# Patient Record
Sex: FEMALE | Race: WHITE
[De-identification: names, ages, dates, MRNs, and addresses within clinical notes are randomized per-mention and may not be internally consistent; named-entity substitution may affect disease eponyms.]

---

## 2021-12-30 ENCOUNTER — HOSPITAL ENCOUNTER (INPATIENT)
Dept: HOSPITAL 96 - M.ERS | Age: 51
LOS: 6 days | Discharge: HOME HEALTH | DRG: 417 | End: 2022-01-05
Attending: INTERNAL MEDICINE | Admitting: INTERNAL MEDICINE
Payer: MEDICARE

## 2021-12-30 VITALS — HEIGHT: 60 IN | BODY MASS INDEX: 23.75 KG/M2 | WEIGHT: 121 LBS

## 2021-12-30 VITALS — DIASTOLIC BLOOD PRESSURE: 70 MMHG | SYSTOLIC BLOOD PRESSURE: 123 MMHG

## 2021-12-30 VITALS — DIASTOLIC BLOOD PRESSURE: 57 MMHG | SYSTOLIC BLOOD PRESSURE: 120 MMHG

## 2021-12-30 VITALS — SYSTOLIC BLOOD PRESSURE: 118 MMHG | DIASTOLIC BLOOD PRESSURE: 68 MMHG

## 2021-12-30 DIAGNOSIS — J15.9: ICD-10-CM

## 2021-12-30 DIAGNOSIS — K80.00: Primary | ICD-10-CM

## 2021-12-30 DIAGNOSIS — R65.10: ICD-10-CM

## 2021-12-30 DIAGNOSIS — E87.0: ICD-10-CM

## 2021-12-30 DIAGNOSIS — E87.6: ICD-10-CM

## 2021-12-30 DIAGNOSIS — J12.9: ICD-10-CM

## 2021-12-30 DIAGNOSIS — K42.9: ICD-10-CM

## 2021-12-30 DIAGNOSIS — G80.9: ICD-10-CM

## 2021-12-30 DIAGNOSIS — E86.0: ICD-10-CM

## 2021-12-30 DIAGNOSIS — Z79.899: ICD-10-CM

## 2021-12-30 DIAGNOSIS — Z20.822: ICD-10-CM

## 2021-12-30 LAB
ABSOLUTE EOSINOPHILS: 0.1 THOU/UL (ref 0–0.7)
ABSOLUTE MONOCYTES: 1.1 THOU/UL (ref 0–1.2)
ALBUMIN SERPL-MCNC: 3.3 G/DL (ref 3.4–5)
ALP SERPL-CCNC: 69 U/L (ref 46–116)
ALT SERPL-CCNC: 25 U/L (ref 30–65)
ANION GAP SERPL CALC-SCNC: 12 MMOL/L (ref 7–16)
AST SERPL-CCNC: 24 U/L (ref 15–37)
BILIRUB SERPL-MCNC: 0.8 MG/DL
BUN SERPL-MCNC: 10 MG/DL (ref 7–18)
CALCIUM SERPL-MCNC: 8.9 MG/DL (ref 8.5–10.1)
CHLORIDE SERPL-SCNC: 99 MMOL/L (ref 98–107)
CO2 SERPL-SCNC: 26 MMOL/L (ref 21–32)
CREAT SERPL-MCNC: 0.5 MG/DL (ref 0.6–1.3)
EOSINOPHIL NFR BLD: 1 %
GLUCOSE SERPL-MCNC: 86 MG/DL (ref 70–99)
GRANULOCYTES NFR BLD MANUAL: 80 %
HCT VFR BLD CALC: 40.3 % (ref 37–47)
HGB BLD-MCNC: 13.1 GM/DL (ref 12–15)
LIPASE: 60 U/L (ref 73–393)
LYMPHOCYTES # BLD: 1.5 THOU/UL (ref 0.8–5.3)
LYMPHOCYTES NFR BLD AUTO: 11 %
MAGNESIUM SERPL-MCNC: 2 MG/DL (ref 1.8–2.4)
MCH RBC QN AUTO: 27.5 PG (ref 26–34)
MCHC RBC AUTO-ENTMCNC: 32.4 G/DL (ref 28–37)
MCV RBC: 84.7 FL (ref 80–100)
MONOCYTES NFR BLD: 8 %
MPV: 7.8 FL. (ref 7.2–11.1)
NEUTROPHILS # BLD: 10.6 THOU/UL (ref 1.6–8.1)
NUCLEATED RBCS: 0 /100WBC
PLATELET # BLD EST: ADEQUATE 10*3/UL
PLATELET COUNT*: 319 THOU/UL (ref 150–400)
POTASSIUM SERPL-SCNC: 3.9 MMOL/L (ref 3.5–5.1)
PROT SERPL-MCNC: 7.7 G/DL (ref 6.4–8.2)
RBC # BLD AUTO: 4.76 MIL/UL (ref 4.2–5)
RBC MORPH BLD: NORMAL
RDW-CV: 13.3 % (ref 10.5–14.5)
SODIUM SERPL-SCNC: 137 MMOL/L (ref 136–145)
WBC # BLD AUTO: 13.2 THOU/UL (ref 4–11)

## 2021-12-30 NOTE — EKG
[place] [address]
Phone:  [phone]                     ELECTROCARDIOGRAM REPORT      
_______________________________________________________________________________
 
Name:         MONSERRAT BAUTISTA                 Room:                     REG ER 
M.R.#:    [id number]     Account #:     [id number]  
Admission:    21    Attend Phys:                     
Discharge:                Date of Birth: [birth year]  
Date of Service: 21 1216  Report #:      [id number]
        [id number]
_______________________________________________________________________________
THIS REPORT FOR:  //name//                      
 
                         [facility] ED
                                       
Test Date:    2021               Test Time:    12:16:06
Pat Name:     MONSERRAT BAUTISTA              Department:   
Patient ID:   [id number]            Room:          
Gender:       F                        Technician:   
:          1970               Requested By: Monse Jacobo
Order Number: [id number] MD:   Abbe Eckert
                                 Measurements
Intervals                              Axis          
Rate:         99                       P:            52
LA:           129                      QRS:          9
QRSD:         84                       T:            -6
QT:           322                                    
QTc:          414                                    
                           Interpretive Statements
Sinus rhythm
Probable left atrial enlargement
Low voltage, extremity and precordial leads
RSR' in V1 or V2, right VCD
Consider anterior infarct of indeterminate age
No previous ECG available for comparison
Electronically Signed On 2021 15:51:29 CST by Abbe Eckert
[email or web address]
 
 
 
 
 
 
 
 
 
 
 
 
 
 
 
 
 
 
  <ELECTRONICALLY SIGNED>
                                           By: Abbe Eckert MD, [facility]     
  21     1551
D: 21 1216   _____________________________________
T: 21 1216   Abbe Eckert MD, [facility]       /EPI

## 2021-12-30 NOTE — EMS
[address]  [address]                    EMS Patient Care Report       
_______________________________________________________________________________
 
Name:       MONSERRAT BAUTISTA                  Room:           [address] [place]  
[facility]#:  [id number]      Account #:      [id number]  
Admission:  21     Attend Phys:    Jil Sow MD 
Discharge:               Date of Birth:  [birth year]  
         Report #: [id number]
                                                                     [id number]
_______________________________________________________________________________
THIS REPORT FOR:  //name//                      
 
Report Transmitted: 2021 22:06
EMS Care Summary
[facility] [place]
Incident [id number] @ 2021 10:53
 
Incident Location
[address] [place], [place] [id number]
 
Patient
MONSERRAT BAUTISTA
Female, 51 Years
 1970
 
Patient Address
[address] [place] [address]
 
Patient History
Unspecified asthma,Cerebral palsy, unspecified,Spina bifida, unspecified,
 
Patient Allergies
No known allergies,
 
Patient Medications
Albuterol, levocetirizine, Ondansetron, Epipen,
 
Chief Complaint
Nausea
 
Disposition
Transported No Lights/Tyler
 
Dispatch Reason
Sick Person
 
Transported To
[facility]
 
Narrative
 DISPATCHED TO [facility].  RESPONDS WITH NO DELAYING FACTORS. 
UPON ARRIVAL PT IS SITTING IN RECLINER UNDER TWO BLANKETS. PT IS ALERT AND 
ORIENTED AT BASELINE WITH PATENT AIRWAY. PT NURSING STAFF STATES PT BEING 
NON-VERBAL. PT KNOWS BASIC SIGN LANGUAGE. PT HAS MANUAL VITALS OBTAINED WITH 
 
 
 
[address]  [address]                    EMS Patient Care Report       
_______________________________________________________________________________
 
Name:       MONSERRAT BAUTISTA                  Room:           [address] [place]  
[facility]#:  [id number]      Account #:      [id number]  
Admission:  21     Attend Phys:    Jil Sow MD 
Discharge:               Date of Birth:  [birth year]  
         Report #: [id number]
                                                                     [id number]
_______________________________________________________________________________
LUNG SOUNDS PRESENTING CLEAR. PT NURSING STAFF STATES PT VOMITING FOR ONE WEEK 
AND PRESENTED THIS MORNING WITH FEVER. NURSING STAFF STATES ADMINISTERING 
TYLENOL AT 1050 TO PT. PT TAKES TWO STEPS TO STRETCHER AND IS ASSISTED INTO 
POSITION OF COMFORT. PT IS SECURED TO STRETCHER AND TRANSPORTED TO AMBULANCE 
WITH NO INCIDENT. ONCE IN AMBULANCE PT IS PLACED ON MONITOR. PT HAS GLUCOSE 
OBTAINED. PT REMAINS ALERT AND ORIENTED AT BASELINE DURING TRANSPORT. PT 
REMAINS ON MONITOR DURING TRANSPORT. COVID PRECAUTIONS ACTIVATED PRIOR TO 
ARRIVAL OF DESTINATION. AT DESTINATION PT IS REMOVED FROM MONITOR. PT IS 
TRANSPORTED INTO FACILITY WITH NO INCIDENT. PT IS TRANSFERRED TO ER4 BED VIA 
BLANKET WITH ASSISTANCE FROM NURSING STAFF. PT IS STABLE AT TIME OF PT CARE 
TRANSFER. PT CARE IS TRANSFERRED. FACILITY SIGNED ON PT BEHALF DUE TO PT NOT 
HAVING RIGHTS ON MEDICAL DECISIONS.  CLEAR. 
 
Initial Vitals
@11:08SpO2: 97,
@11:13SpO2: 99,
@11:28SpO2: 97,
@11:12
@11:17
@11:27
@11:13P: 76,R: 18,BP: 113/80,
@11:08P: 99,R: 18,BP: 130/82,
@11:13GCS: 15,
@11:08GCS: 15,
@11:00Glucose: 88,
 
Assessments
@11:00MENTAL:SKIN:HEENT:LUNG 
SOUNDS:ABDOMEN:PELVIS//GI:EXTREMITIES:PULSE:NEURO: 
 
Impression
Nausea
 
Procedures
@11:12 3-Lead ECG Response: UnchangedSucceeded
@11:17 3-Lead ECG Response: UnchangedSucceeded
@11:27 3-Lead ECG Response: UnchangedSucceeded
 
 
Timeline
00:00,Call Received
10:52,Dispatch Notified
10:52,Psap Call
10:53,Dispatched
10:54,En Route
10:59,On Scene
 
 
 
[place] [address]                    EMS Patient Care Report       
_______________________________________________________________________________
 
Name:       MONSERRAT BAUTISTA                  Room:           [address] [place]  
[facility]#:  [id number]      Account #:      [id number]  
Admission:  21     Attend Phys:    Jil Sow MD 
Discharge:               Date of Birth:  [birth year]  
         Report #: [id number]
                                                                     [id number]
_______________________________________________________________________________
11:00,At Patient
11:00,BP: / M,PULSE: ,RR:  R,SPO2:  Ox,ETCO2:  ,B,PAIN: ,GCS: ,
11:08,BP: / M,PULSE: ,RR:  R,SPO2: 97 Ox,ETCO2:  ,BG: ,PAIN: ,GCS: ,
11:08,BP: 130/82 M,PULSE: 99,RR: 18 R,SPO2:  Ox,ETCO2:  ,BG: ,PAIN: ,GCS: ,
11:08,BP: / M,PULSE: ,RR:  R,SPO2:  Ox,ETCO2:  ,BG: ,PAIN: ,GCS: 15,
11:12,3-Lead ECG,Response: UnchangedSucceeded,
11:12,BP: / M,PULSE: ,RR:  R,SPO2:  Ox,ETCO2:  ,BG: ,PAIN: ,GCS: ,
11:13,BP: / M,PULSE: ,RR:  R,SPO2: 99 Ox,ETCO2:  ,BG: ,PAIN: ,GCS: ,
11:13,BP: 113/80 M,PULSE: 76,RR: 18 R,SPO2:  Ox,ETCO2:  ,BG: ,PAIN: ,GCS: ,
11:13,BP: / M,PULSE: ,RR:  R,SPO2:  Ox,ETCO2:  ,BG: ,PAIN: ,GCS: 15,
11:15,Depart Scene
11:17,3-Lead ECG,Response: UnchangedSucceeded,
11:17,BP: / M,PULSE: ,RR:  R,SPO2:  Ox,ETCO2:  ,BG: ,PAIN: ,GCS: ,
11:27,3-Lead ECG,Response: UnchangedSucceeded,
11:27,BP: / M,PULSE: ,RR:  R,SPO2:  Ox,ETCO2:  ,BG: ,PAIN: ,GCS: ,
11:28,BP: / M,PULSE: ,RR:  R,SPO2: 97 Ox,ETCO2:  ,BG: ,PAIN: ,GCS: ,
11:38,At Destination
11:52,Call Closed
 
Disclaimer
v1.1     Copyright  [employer]
This EMS Care Summary contains data elements from the applicable legal record 
(which may be displayed differently). It is designed to provide pertinent 
information for the following purposes: continuity of care, clinical quality, 
and state data reporting. The complete legal record is available to ED staff 
and administrators of the receiving hospital in [employer]'s Patient Tracker. All data 
is provided "as is."

## 2021-12-31 VITALS — DIASTOLIC BLOOD PRESSURE: 63 MMHG | SYSTOLIC BLOOD PRESSURE: 109 MMHG

## 2021-12-31 VITALS — SYSTOLIC BLOOD PRESSURE: 110 MMHG | DIASTOLIC BLOOD PRESSURE: 57 MMHG

## 2021-12-31 VITALS — DIASTOLIC BLOOD PRESSURE: 58 MMHG | SYSTOLIC BLOOD PRESSURE: 119 MMHG

## 2021-12-31 VITALS — SYSTOLIC BLOOD PRESSURE: 116 MMHG | DIASTOLIC BLOOD PRESSURE: 59 MMHG

## 2021-12-31 VITALS — SYSTOLIC BLOOD PRESSURE: 127 MMHG | DIASTOLIC BLOOD PRESSURE: 68 MMHG

## 2021-12-31 LAB
ALBUMIN SERPL-MCNC: 3.2 G/DL (ref 3.4–5)
ALP SERPL-CCNC: 77 U/L (ref 46–116)
ALT SERPL-CCNC: 24 U/L (ref 30–65)
ANION GAP SERPL CALC-SCNC: 16 MMOL/L (ref 7–16)
AST SERPL-CCNC: 18 U/L (ref 15–37)
BILIRUB SERPL-MCNC: 0.8 MG/DL
BUN SERPL-MCNC: 17 MG/DL (ref 7–18)
CALCIUM SERPL-MCNC: 8.8 MG/DL (ref 8.5–10.1)
CHLORIDE SERPL-SCNC: 102 MMOL/L (ref 98–107)
CO2 SERPL-SCNC: 20 MMOL/L (ref 21–32)
CREAT SERPL-MCNC: 0.5 MG/DL (ref 0.6–1.3)
GLUCOSE SERPL-MCNC: 63 MG/DL (ref 70–99)
HCT VFR BLD CALC: 39.2 % (ref 37–47)
HGB BLD-MCNC: 12.9 GM/DL (ref 12–15)
MAGNESIUM SERPL-MCNC: 1.9 MG/DL (ref 1.8–2.4)
MCH RBC QN AUTO: 27.5 PG (ref 26–34)
MCHC RBC AUTO-ENTMCNC: 33 G/DL (ref 28–37)
MCV RBC: 83.5 FL (ref 80–100)
MPV: 7.6 FL. (ref 7.2–11.1)
PLATELET COUNT*: 330 THOU/UL (ref 150–400)
POTASSIUM SERPL-SCNC: 3.8 MMOL/L (ref 3.5–5.1)
PROT SERPL-MCNC: 7 G/DL (ref 6.4–8.2)
RBC # BLD AUTO: 4.69 MIL/UL (ref 4.2–5)
RDW-CV: 13 % (ref 10.5–14.5)
SODIUM SERPL-SCNC: 138 MMOL/L (ref 136–145)
WBC # BLD AUTO: 13 THOU/UL (ref 4–11)

## 2021-12-31 NOTE — NUR
TALKED TO MOTHER BOLIVAR ABOUT MONSERRAT HAVING SURGERY TOMORROW. BOLIVAR VERBALIZED
UNDERSTANDING AND GAVE CONSENT FOR DAUGHTER TO HAVE SURGERY. DR. CAPUTO ALSO
NOTIFIED OF VERBAL CONSENT BY MOTHER FOR DAUGHTER TO HAVE SURGERY. CONSENT
OBTAINED BY THIS NURSE AND CARLEY BAUMANN.

## 2021-12-31 NOTE — NUR
Pt arrived from ED at 1730.  Pt is non-verbal and from group home.  Mother is
DPOA and deaf.  Per ED RN, mother is able to communicate via telephone with
fujz-dn-uzcs/zlep-nk-hsid capabilities (in mother's possession).  VSS.  Pt
cooperative with gentle communication, though unable to determine how much she
is able to understand.  Pt incontinent of drk green stool at times.  Did not
attempt to transfer or ambulate pt at this time.  Will continue to monitor.

## 2021-12-31 NOTE — NUR
REFUSED BREAKFAST AND AM MEDICATIONS. ATE SOME JELLO FOR LUNCH. INCONT. B &B.
INCONT X 2 BM. PT FOUND PLAYING WITH FECES WITH HER HANDS.

## 2022-01-01 VITALS — SYSTOLIC BLOOD PRESSURE: 127 MMHG | DIASTOLIC BLOOD PRESSURE: 54 MMHG

## 2022-01-01 VITALS — DIASTOLIC BLOOD PRESSURE: 64 MMHG | SYSTOLIC BLOOD PRESSURE: 116 MMHG

## 2022-01-01 VITALS — SYSTOLIC BLOOD PRESSURE: 125 MMHG | DIASTOLIC BLOOD PRESSURE: 70 MMHG

## 2022-01-01 VITALS — SYSTOLIC BLOOD PRESSURE: 120 MMHG | DIASTOLIC BLOOD PRESSURE: 70 MMHG

## 2022-01-01 LAB
ALBUMIN SERPL-MCNC: 2.9 G/DL (ref 3.4–5)
ALP SERPL-CCNC: 78 U/L (ref 46–116)
ALT SERPL-CCNC: 27 U/L (ref 30–65)
ANION GAP SERPL CALC-SCNC: 16 MMOL/L (ref 7–16)
AST SERPL-CCNC: 38 U/L (ref 15–37)
BILIRUB SERPL-MCNC: 0.4 MG/DL
BUN SERPL-MCNC: 13 MG/DL (ref 7–18)
CALCIUM SERPL-MCNC: 8.6 MG/DL (ref 8.5–10.1)
CHLORIDE SERPL-SCNC: 105 MMOL/L (ref 98–107)
CO2 SERPL-SCNC: 19 MMOL/L (ref 21–32)
CREAT SERPL-MCNC: 0.7 MG/DL (ref 0.6–1.3)
GLUCOSE SERPL-MCNC: 116 MG/DL (ref 70–99)
HCT VFR BLD CALC: 39.4 % (ref 37–47)
HGB BLD-MCNC: 12.3 GM/DL (ref 12–15)
MAGNESIUM SERPL-MCNC: 1.9 MG/DL (ref 1.8–2.4)
MCH RBC QN AUTO: 27.5 PG (ref 26–34)
MCHC RBC AUTO-ENTMCNC: 31.3 G/DL (ref 28–37)
MCV RBC: 88.1 FL (ref 80–100)
MPV: 7.7 FL. (ref 7.2–11.1)
PLATELET COUNT*: 433 THOU/UL (ref 150–400)
POTASSIUM SERPL-SCNC: 3.8 MMOL/L (ref 3.5–5.1)
PROT SERPL-MCNC: 7.6 G/DL (ref 6.4–8.2)
RBC # BLD AUTO: 4.47 MIL/UL (ref 4.2–5)
RDW-CV: 13.5 % (ref 10.5–14.5)
SODIUM SERPL-SCNC: 140 MMOL/L (ref 136–145)
WBC # BLD AUTO: 20.9 THOU/UL (ref 4–11)

## 2022-01-01 PROCEDURE — 0WQF4ZZ REPAIR ABDOMINAL WALL, PERCUTANEOUS ENDOSCOPIC APPROACH: ICD-10-PCS | Performed by: SURGERY

## 2022-01-01 PROCEDURE — 0FT44ZZ RESECTION OF GALLBLADDER, PERCUTANEOUS ENDOSCOPIC APPROACH: ICD-10-PCS | Performed by: SURGERY

## 2022-01-01 NOTE — NUR
PT HAD LAP LATONYA TODAY. OP SITES X4 WITH DERMABOND. PT'S MOTHER UPDATED.
ADVANCED TO CLEAR LIQUID DIET. MESSAGE LEFT FOR KEISHA WITH HER GROUP HOME TO
SEE IF PT HAS HAD HER FLU VACCINE. NO RETURN CALL AT THIS TIME. INCONT OF
BLADDER. NO STOOL TODAY. HAS NOT REQUIRED ANY PAIN MEDOICATION SINCE RETURN
FROM SURGERY. CALL LIGHT IN REACH. FALL PRECAUTIONS IN PLACE.

## 2022-01-02 VITALS — SYSTOLIC BLOOD PRESSURE: 113 MMHG | DIASTOLIC BLOOD PRESSURE: 75 MMHG

## 2022-01-02 VITALS — DIASTOLIC BLOOD PRESSURE: 62 MMHG | SYSTOLIC BLOOD PRESSURE: 114 MMHG

## 2022-01-02 VITALS — SYSTOLIC BLOOD PRESSURE: 103 MMHG | DIASTOLIC BLOOD PRESSURE: 41 MMHG

## 2022-01-02 VITALS — SYSTOLIC BLOOD PRESSURE: 132 MMHG | DIASTOLIC BLOOD PRESSURE: 72 MMHG

## 2022-01-02 LAB
ALBUMIN SERPL-MCNC: 2.7 G/DL (ref 3.4–5)
ALP SERPL-CCNC: 62 U/L (ref 46–116)
ALT SERPL-CCNC: 34 U/L (ref 30–65)
ANION GAP SERPL CALC-SCNC: 10 MMOL/L (ref 7–16)
AST SERPL-CCNC: 31 U/L (ref 15–37)
BILIRUB SERPL-MCNC: 0.4 MG/DL
BUN SERPL-MCNC: 12 MG/DL (ref 7–18)
CALCIUM SERPL-MCNC: 8.9 MG/DL (ref 8.5–10.1)
CHLORIDE SERPL-SCNC: 104 MMOL/L (ref 98–107)
CO2 SERPL-SCNC: 27 MMOL/L (ref 21–32)
CREAT SERPL-MCNC: 0.6 MG/DL (ref 0.6–1.3)
GLUCOSE SERPL-MCNC: 110 MG/DL (ref 70–99)
HCT VFR BLD CALC: 36.3 % (ref 37–47)
HGB BLD-MCNC: 11.9 GM/DL (ref 12–15)
MAGNESIUM SERPL-MCNC: 1.9 MG/DL (ref 1.8–2.4)
MCH RBC QN AUTO: 27.9 PG (ref 26–34)
MCHC RBC AUTO-ENTMCNC: 32.8 G/DL (ref 28–37)
MCV RBC: 84.8 FL (ref 80–100)
MPV: 7.7 FL. (ref 7.2–11.1)
PLATELET COUNT*: 411 THOU/UL (ref 150–400)
POTASSIUM SERPL-SCNC: 3.6 MMOL/L (ref 3.5–5.1)
PROT SERPL-MCNC: 7.3 G/DL (ref 6.4–8.2)
RBC # BLD AUTO: 4.28 MIL/UL (ref 4.2–5)
RDW-CV: 13.3 % (ref 10.5–14.5)
SODIUM SERPL-SCNC: 141 MMOL/L (ref 136–145)
WBC # BLD AUTO: 11.6 THOU/UL (ref 4–11)

## 2022-01-02 NOTE — NUR
PT WAS RESTING ON HER BED TRYING TO GET UP. PT HAD AN ACCIDENT IN THE BED. PT
CLEANED UP AND PLACED BACK IN HER BED. PT THEN GOT UP AGAIN BY HERSELF AND WAS
CAUGHT AND WE PICKED HER UP AND PLACED HER IN HER BED. PT KEPT POINTING TO HER
ABD AND WOULD NOT WERE HER CLOTHES OR COVER UP WITH BLKANKETS. PT PULLED OUT
HER IV. AND WOULD NOT EAT OR TAKE ANY OF HER MEDICATIONS EVEN WHEN PUT IN
APPLE SAUCE. VSS AFEBRILE. PT CAN GOT HOME WHEN SHE STARTS EATING. WILL
CONTINUE TO MONITOR PLAN OF CARE. PT MOTHER HERE TODAY TO SEE HER.

## 2022-01-02 NOTE — OP
[address]  [address]                    OPERATIVE REPORT              
_______________________________________________________________________________
 
Name:       MONSERRAT BAUTISTA                  Room:           [address] [place]  
M.R.#:  [id number]      Account #:      [id number]  
Admission:  12/30/21     Attend Phys:    Jil Sow MD 
Discharge:               Date of Birth:  [birth year]  
         Report #: [id number]
                                                                     [id number]
_______________________________________________________________________________
THIS REPORT FOR:  
 
cc:  Miryam Wayne Linda J. DO Harper, Charles B. III DO                                          ~
 
DATE OF SURGERY: 01/01/2022
 
PREOPERATIVE DIAGNOSES:  Acute cholecystitis and cholelithiasis, umbilical 
hernia.
 
POSTOPERATIVE DIAGNOSES:  Acute cholecystitis and cholelithiasis, umbilical 
hernia.
 
PROCEDURE PERFORMED:  Laparoscopic cholecystectomy with immunofluorescence 
imaging, primary repair of umbilical hernia.
 
SURGEON:  Isaac Mercedes DO.
 
CO-SURGEON:  Cristian Parmar DO, PGY5.
 
ASSISTANT:  KATHERINE Bai.
 
ANESTHESIA:  General and local.
 
ESTIMATED BLOOD LOSS:  20.
 
SPECIMEN:  Gallbladder.
 
COMPLICATIONS:  None.
 
HISTORY OF PRESENT ILLNESS:  The patient is a 51-year-old female with cerebral 
palsy, who was admitted to the hospital with acute cholecystitis and possible 
pneumonia.  We discussed with the POA risks, benefits and alternatives at 
length, and discussed the need for laparoscopic cholecystectomy at this time and
they agreed to proceed.
 
DESCRIPTION OF PROCEDURE:  After consent was obtained, the patient was taken to 
the operating room and placed in the supine position.  SCDs were applied to 
bilateral lower extremities; safety belt placed across the patient's waist.  
Perioperative antibiotics were continued.  The patient underwent general 
endotracheal anesthesia without any complication.  The patient's abdomen was 
prepped and draped in the standard sterile fashion.  Timeout was performed to 
confirm patient and procedure.  A 15 blade scalpel was used to make a transverse
incision just below the umbilicus.  The umbilical hernia was reduced into the 
abdomen and a 12 mm Dilan trocar was inserted into the abdomen.  Insufflation 
 
 
 
[place] [address]                    OPERATIVE REPORT              
_______________________________________________________________________________
 
Name:       MONSERRAT BAUTISTA S                  Room:           [address]#:  [id number]      Account #:      [id number]  
Admission:  12/30/21     Attend Phys:    Jil Sow MD 
Discharge:               Date of Birth:  [birth year]  
         Report #: [id number]
                                                                     [id number]
_______________________________________________________________________________
 
was initiated.  Intraabdominal contents were inspected.  There was a big sheet 
of the mesentery overlying the gallbladder.  A 5 mm trocar was placed subxiphoid
and a suction  was used to sweep the inflamed mesentery off of the 
gallbladder.  The gallbladder was extremely distended and edematous.  Two more 
trocars, 5 mm were placed in the right upper quadrant.  Adhesions were taken off
the gallbladder bluntly and with electrocautery.  An aspiration needle was 
inserted into the gallbladder and 30 mL of bile was aspirated.  The gallbladder 
was then grasped and elevated.  Attention was turned towards Kingstno's pouch.  
The visceral peritoneum overlying Kingston's pouch was scored with 
electrocautery and carried laterally and medially.  Blunt sweep was performed to
help visualize both the cystic duct and the cystic artery. [place] dissector 
was used to circumferentially isolate the cystic duct from surrounding 
structures along with the cystic artery on the medial aspect.  Both structures 
were seen going up into the gallbladder.  Three clips were placed on both duct 
and the artery after critical view of safety was obtained.  Both structures were
cut. Just posterior to the duct, there was another small artery going up into 
the gallbladder.  This was triply clipped and cut. After confirming it was 
indeed going into the gallbladder, the gallbladder was then carefully dissected 
off the bed of the liver using hook electrocautery.  Once the gallbladder was 
completely removed, it was placed in laparoscopic EndoCatch bag.  The liver bed 
was inspected and hemostasis was ensured with electrocautery.  Right upper 
quadrant was irrigated copiously and all fluid was suctioned out.  Once 
hemostasis was completely confirmed, the patient was turned back to the neutral 
position and all trocars were removed under direct visualization.  The 
gallbladder and its contents were removed from the infraumbilical trocar site.  
Infraumbilical fascia was reapproximated with 2 stitches of 0 Prolene in a 
figure-of-eight fashion completing the primary repair of her umbilical hernia.  
Subcutaneous tissue was reapproximated with 3-0 Vicryl.  All skin incisions 
reapproximated with 4-0 Monocryl.  The patient was awoken from general 
anesthesia and transferred to PACU in stable condition.
 
 
 
 
 
 
 
 
 
 
 
 
 
<ELECTRONICALLY SIGNED>
                                        By:  Isaac Mercedes III, DO    
01/02/22     1230
D: 01/01/22 1009_______________________________________
T: 01/01/22 1112Cmarlene Mercedes III DO       /nt

## 2022-01-03 VITALS — DIASTOLIC BLOOD PRESSURE: 65 MMHG | SYSTOLIC BLOOD PRESSURE: 118 MMHG

## 2022-01-03 VITALS — SYSTOLIC BLOOD PRESSURE: 127 MMHG | DIASTOLIC BLOOD PRESSURE: 66 MMHG

## 2022-01-03 VITALS — SYSTOLIC BLOOD PRESSURE: 100 MMHG | DIASTOLIC BLOOD PRESSURE: 45 MMHG

## 2022-01-03 VITALS — SYSTOLIC BLOOD PRESSURE: 132 MMHG | DIASTOLIC BLOOD PRESSURE: 68 MMHG

## 2022-01-03 LAB
ALBUMIN SERPL-MCNC: 2.9 G/DL (ref 3.4–5)
ALP SERPL-CCNC: 60 U/L (ref 46–116)
ALT SERPL-CCNC: 31 U/L (ref 30–65)
ANION GAP SERPL CALC-SCNC: 14 MMOL/L (ref 7–16)
AST SERPL-CCNC: 24 U/L (ref 15–37)
BILIRUB SERPL-MCNC: 0.4 MG/DL
BUN SERPL-MCNC: 17 MG/DL (ref 7–18)
CALCIUM SERPL-MCNC: 8.5 MG/DL (ref 8.5–10.1)
CHLORIDE SERPL-SCNC: 105 MMOL/L (ref 98–107)
CO2 SERPL-SCNC: 26 MMOL/L (ref 21–32)
CREAT SERPL-MCNC: 0.5 MG/DL (ref 0.6–1.3)
GLUCOSE SERPL-MCNC: 89 MG/DL (ref 70–99)
HCT VFR BLD CALC: 36.2 % (ref 37–47)
HGB BLD-MCNC: 11.7 GM/DL (ref 12–15)
MAGNESIUM SERPL-MCNC: 2 MG/DL (ref 1.8–2.4)
MCH RBC QN AUTO: 27.3 PG (ref 26–34)
MCHC RBC AUTO-ENTMCNC: 32.2 G/DL (ref 28–37)
MCV RBC: 84.9 FL (ref 80–100)
MPV: 7.8 FL. (ref 7.2–11.1)
PLATELET COUNT*: 415 THOU/UL (ref 150–400)
POTASSIUM SERPL-SCNC: 3.6 MMOL/L (ref 3.5–5.1)
PROT SERPL-MCNC: 6.5 G/DL (ref 6.4–8.2)
RBC # BLD AUTO: 4.27 MIL/UL (ref 4.2–5)
RDW-CV: 13.3 % (ref 10.5–14.5)
SODIUM SERPL-SCNC: 145 MMOL/L (ref 136–145)
WBC # BLD AUTO: 8.4 THOU/UL (ref 4–11)

## 2022-01-03 NOTE — NUR
CM ASSESSMENT:
CM SPOKE TO THE PT'S NOTHER AT THE BEDSIDE. PT'S MOTHER INFORMS THAT SHE IS
DEAF, BUT IS ABLE TO READ LIPS. PT'S MOTHER INFORMS THAT THE PT RESIDE AT A
GROUP HOME AND THE THE STAFF THERE ASSIST THE PT WITH ALL CARES AND FEEDING.
PLAN IF FOR THE PT TO RETURN TO THE GROUP HOME AT D/C. CM WILL REMAIN
AVAILABLE TO ASSIST AND FOLLOW AS NEEDED.

## 2022-01-03 NOTE — NUR
ASSUMED CARES AT 1920. ALERT BUT NONVERBAL. CAN BE IMPULSIVE AND COMBATIVE.
TOOK PILLS CRUSHED WITH APPLESAUCE. REFUSED ADDITIONAL FOODS/DRINKS WHEN
OFFERED.  INCONTINENT BOWEL AND BLADDER. SLEPT SOME OFF AND ON.

## 2022-01-03 NOTE — NUR
AM ASSESSMENT AND VITAL SIGNS CONMPLETED AS DOCUMENTED. PT DOESN'T EXHIBIT ANY
SIGNS OF DISCOMFORT. MEDICATIONS WERE CRUSHED IN PUDDING AND PATIENT ONLY TOOK
ONE BITE. LAP SITES X 4 ARE CLEAN AND DRY. PT'S APPETITE HAS BEEN POOR BUT SHE
DRINKS THE ENSURE PLUS WITH EACH MEAL. FREQUENT OBSERVATION AND FALL
PRECAUTIONS IN PLACE.

## 2022-01-04 VITALS — SYSTOLIC BLOOD PRESSURE: 136 MMHG | DIASTOLIC BLOOD PRESSURE: 93 MMHG

## 2022-01-04 VITALS — DIASTOLIC BLOOD PRESSURE: 73 MMHG | SYSTOLIC BLOOD PRESSURE: 117 MMHG

## 2022-01-04 VITALS — DIASTOLIC BLOOD PRESSURE: 62 MMHG | SYSTOLIC BLOOD PRESSURE: 111 MMHG

## 2022-01-04 LAB
ANION GAP SERPL CALC-SCNC: 9 MMOL/L (ref 7–16)
BUN SERPL-MCNC: 21 MG/DL (ref 7–18)
CALCIUM SERPL-MCNC: 8.3 MG/DL (ref 8.5–10.1)
CHLORIDE SERPL-SCNC: 106 MMOL/L (ref 98–107)
CO2 SERPL-SCNC: 31 MMOL/L (ref 21–32)
CREAT SERPL-MCNC: 0.4 MG/DL (ref 0.6–1.3)
GLUCOSE SERPL-MCNC: 110 MG/DL (ref 70–99)
POTASSIUM SERPL-SCNC: 3 MMOL/L (ref 3.5–5.1)
SODIUM SERPL-SCNC: 146 MMOL/L (ref 136–145)

## 2022-01-04 NOTE — NUR
PT NON VERBAL, ALERT TO SELF, CANNOT COMMUNICATE NEEDS TO STAFF. HAD SEVERAL
BM OVERNIGHT, INCONTINENT OF BOWEL AND BLADDER. SHE APPEARED TO SLEEP WELL
THIS SHIFT AND WOULD CALL OUT WHEN NEEDING CLEANED OR MOVED. SHE TOOK HER MEDS
CRUSHED IN APPLESAUCE BUT WAS RESISTANT TO DRINK MUCH WATER. NO IV ACCESS AT
THIS TIME. WILL CONTINUE TO MONITOR.

## 2022-01-04 NOTE — NUR
PATIENT NON VERBAL THIS SHIFT EXCEPT FOR SAYING HI TO STAFF. PATIENT IS
COMBATIVE WHEN INCONTINENT AND BEING CHANGED. DR. PALM NOTIFIED OF LOOSE
STOOL. MEDS CRUSHED AND PUT IN APPLESAUCE, PATIENT TOOK WITHOUT DIFFICULTY.
PATIENT WAS GIVEN POTASSIUM X1 PER PROTOCOL DISSOLVED IN ENSURE. PATIENT
ASSISTED WITH FEEDING BUT DOES HAVE POOR APPETITE. IV WAS PLACED TO LEFT FA
BY IV NURSE PER DR. RIVREA ORDERS FOR IVF. PATIENT CURRENTLY HAS 1:1 SITTER
WHILE ON IVF PER DR. RIVERA ORDERS. FALL RISK PROTOCOL IN PLACE.

## 2022-01-05 VITALS — DIASTOLIC BLOOD PRESSURE: 77 MMHG | SYSTOLIC BLOOD PRESSURE: 126 MMHG

## 2022-01-05 VITALS — SYSTOLIC BLOOD PRESSURE: 126 MMHG | DIASTOLIC BLOOD PRESSURE: 77 MMHG

## 2022-01-05 LAB
ALBUMIN SERPL-MCNC: 2.7 G/DL (ref 3.4–5)
ALP SERPL-CCNC: 54 U/L (ref 46–116)
ALT SERPL-CCNC: 22 U/L (ref 30–65)
ANION GAP SERPL CALC-SCNC: 6 MMOL/L (ref 7–16)
AST SERPL-CCNC: 20 U/L (ref 15–37)
BILIRUB SERPL-MCNC: 0.2 MG/DL
BUN SERPL-MCNC: 16 MG/DL (ref 7–18)
CALCIUM SERPL-MCNC: 8.6 MG/DL (ref 8.5–10.1)
CHLORIDE SERPL-SCNC: 110 MMOL/L (ref 98–107)
CO2 SERPL-SCNC: 28 MMOL/L (ref 21–32)
CREAT SERPL-MCNC: 0.4 MG/DL (ref 0.6–1.3)
GLUCOSE SERPL-MCNC: 105 MG/DL (ref 70–99)
HCT VFR BLD CALC: 34.2 % (ref 37–47)
HGB BLD-MCNC: 11.2 GM/DL (ref 12–15)
MAGNESIUM SERPL-MCNC: 1.9 MG/DL (ref 1.8–2.4)
MCH RBC QN AUTO: 27.6 PG (ref 26–34)
MCHC RBC AUTO-ENTMCNC: 32.8 G/DL (ref 28–37)
MCV RBC: 84 FL (ref 80–100)
MPV: 7.3 FL. (ref 7.2–11.1)
PLATELET COUNT*: 418 THOU/UL (ref 150–400)
POTASSIUM SERPL-SCNC: 3.7 MMOL/L (ref 3.5–5.1)
PROT SERPL-MCNC: 6.5 G/DL (ref 6.4–8.2)
RBC # BLD AUTO: 4.08 MIL/UL (ref 4.2–5)
RDW-CV: 13.3 % (ref 10.5–14.5)
SODIUM SERPL-SCNC: 144 MMOL/L (ref 136–145)
WBC # BLD AUTO: 6.5 THOU/UL (ref 4–11)

## 2022-01-05 NOTE — NUR
AT APPROX 2130 PT SLIGHTLY AGITATED, BENADRYL GIVEN AT 2140. AT 2252 IV
INSERTED AGITATION ESCULATED WITH INSERTION. AT 2315 PT SEVERLY AGITATED,
HITING, BITING AND SCREAMING; DIAZAPAM PO GIVEN AS ORDERED. AT 0030 PT STILL
SEVERLY AGITATED. AT 0031 YOU CALL SENT TO PHYSICIAN, POM ORDER INPUT BY DR. PALM FOR HALOPERIDOL 5MG IM ONETIME; GIVEN AS ODERED. AT APPROX 0230 PT
SLEEPING, IV FLUIDS INFUSING AS ORDERED.

## 2022-01-05 NOTE — NUR
PATIENT DISCHARGED AT THIS TIME VIA STRETCHER ACCOMPANIED BY EMS TO [facility].  X4 INCISIONS TO ABDOMEN INTACT WITH DERMABOND.  REPORT GIVEN TO KEISHA
AT [facility].

## 2022-01-05 NOTE — NUR
PLAN FOR THE PT TO D/C TODAY BACK TO HER GROUP HOME WITH HH. CM ARRANGED HH
WITH [facility] PER PT'S MOTHER'S CHOICE. RN TO CALL REPORT TO NURSE AT THE
GROUP HOME. TRANSPORT ARRANGED WITH [facility] NON-EMERGENT TRANSPORT. CM WILL REMAIN
AVAILABLE TO ASSIST AND FOLLOW AS NEEDED.

## 2022-01-07 NOTE — PATH
[address]  [address]                    PATHOLOGY RPT PROCEDURE       
_______________________________________________________________________________
 
Name:       MICHELEMONSERRAT S                  Room:           [address] [place]  
.R.#:  [id number]      Account #:      [id number]  
Admission:  12/30/21     Date of Birth:  [birth year]  
Discharge:  01/05/22                   Report #:    [id number]
                                                         Path Case #: [id number]
_______________________________________________________________________________
 
[facility] Accession Number: [id number]
.                                                                01
Material submitted:                                        .
gallbladder - GALLBLADDER
.                                                                01
Clinical history:                                          .
CHOLECYSTITIS ABDOMINAL PAIN
.                                                                02
**********************************************************************
Diagnosis:
Gallbladder:
- Chronic and acute ulcerative cholecystitis with cholelithiasis, scant
attached benign liver tissue and benign lymph node.
(REBA:rosario; 01/04/2021)
S  01/04/2022  1325 Local
**********************************************************************
.                                                                02
Electronically signed:                                     .
Richard Kelley MD, Pathologist
NPI- [id number]
.                                                                01
Gross description:                                         .
Fixative: Formalin
Labeled: Gallbladder
Specimen received: Previously disrupted gallbladder
Dimensions: 11.1 x 3.8 x 3.6 cm
Serosa: Purple-grey and smooth to slightly roughened
Lymph node: Possibly identified
Mucosa: Red-brown and smooth to irregular
Average wall thickness: Up to 0.6 cm
Calculi: Present displaying a yellow and multifaceted appearance
Abnormalities: None identified
.
A1- Representative body, fundus, and the cystic duct margin.
A2- Possible lymph node, bisected
([facility]; 1/3/2022)
NRI/NRI  01/03/2022  1502 Local
.                                                                02
Pathologist provided ICD-10:
K80.12
.                                                                02
CPT                                                        .
107973
Specimen Comment: A courtesy copy of this report has been sent to [phone]
[phone]
Specimen Comment: [phone]
Specimen Comment: Report sent to  / DR PALM
Specimen Comment: A duplicate report has been generated due to demographic
 
[place] [address]                    PATHOLOGY RPT PROCEDURE       
_______________________________________________________________________________
 
Name:       MONSERRAT BAUTISTA                  Room:           [address] [place]  
[facility]#:  [id number]      Account #:      [id number]  
Admission:  12/30/21     Date of Birth:  [birth year]  
Discharge:  01/05/22                   Report #:    [id number]
                                                         Path Case #: [id number]
_______________________________________________________________________________
updates.
***Performed at:  01
   [facility] [place]
   [address], [place], [place]  [id number]
   MD Burak Servin MD Phone:  [phone]
***Performed at:  02
   [facility]
   [facility] [address], [place], [place]  [id number]
   MD Richard Kelley MD Phone:  [phone]